# Patient Record
Sex: MALE | Race: WHITE | NOT HISPANIC OR LATINO | Employment: FULL TIME | URBAN - NONMETROPOLITAN AREA
[De-identification: names, ages, dates, MRNs, and addresses within clinical notes are randomized per-mention and may not be internally consistent; named-entity substitution may affect disease eponyms.]

---

## 2017-10-18 ENCOUNTER — OFFICE VISIT - GICH (OUTPATIENT)
Dept: FAMILY MEDICINE | Facility: OTHER | Age: 37
End: 2017-10-18

## 2017-10-18 ENCOUNTER — HISTORY (OUTPATIENT)
Dept: FAMILY MEDICINE | Facility: OTHER | Age: 37
End: 2017-10-18

## 2017-10-18 DIAGNOSIS — R59.9 ENLARGED LYMPH NODES: ICD-10-CM

## 2017-10-18 DIAGNOSIS — H00.014 HORDEOLUM EXTERNUM OF LEFT UPPER EYELID: ICD-10-CM

## 2017-12-27 NOTE — PROGRESS NOTES
"Patient Information     Patient Name MRN Sex Yrn Lundy [id number] Male 1980      Progress Notes by Nelli Castro NP at 10/18/2017  3:30 PM     Author:  Nelli Castro NP Service:  (none) Author Type:  PHYS- Nurse Practitioner     Filed:  10/19/2017 12:06 PM Encounter Date:  10/18/2017 Status:  Signed     :  Nelli Castro NP (PHYS- Nurse Practitioner)            Nursing Notes:   Yumi Singer  10/18/2017  4:56 PM  Signed  Patient presents to the clinic for facial swelling on his left side and sore jaw x5 days. Patient reports getting a sty in his left eye about a week ago and now reports that side of his face swelling and being painful. He felt lumps at the left corner of his jaw but says they went away.  Yumi COFFMAN CMA.......10/18/2017..4:11 PM    SUBJECTIVE:    Yrn Falk is a 37 y.o. male who presents for face swelling after an eye infection    HPI  Yrn Falk is here with neck swelling following an eye infection. He feels the eye infection is better but now has neck swelling. No Fevers, chills. Eye infection a stye about a week ago, thi sis getting better. Then his face swelled up, this is better too. He is now worried about the neck swelling. Is eating and drinking well.     Current Outpatient Prescriptions on File Prior to Visit       Medication  Sig Dispense Refill     ketoconazole 2% shampoo (NIZORAL) 2 % shampoo Apply for 5 minutes and wash off. 120 mL 1     No current facility-administered medications on file prior to visit.     and   Patient Active Problem List     Diagnosis  Code     Tobacco use Z72.0     Tinea corporis B35.4     Tinea versicolor B36.0       REVIEW OF SYSTEMS:  Review of Systems   Constitutional: Negative.    Eyes: Negative.    Respiratory: Negative.    Cardiovascular: Negative.    Skin: Negative.        OBJECTIVE:  /70  Pulse 64  Temp 97.3  F (36.3  C) (Tympanic)  Ht 1.981 m (6' 6\")  Wt 83.2 kg (183 lb 8 oz)  BMI 21.21 " kg/m2    EXAM:   Physical Exam   Constitutional: He is well-developed, well-nourished, and in no distress.   HENT:   Head: Normocephalic and atraumatic.   Right Ear: Tympanic membrane and ear canal normal.   Left Ear: Tympanic membrane and ear canal normal.   Nose: Nose normal.   Mouth/Throat: Uvula is midline, oropharynx is clear and moist and mucous membranes are normal.   Eyes: Conjunctivae are normal. Left eye exhibits hordeolum.   Healing stye upper LT eye lid   Neck: Neck supple.   Cardiovascular: Normal rate.    Pulmonary/Chest: Effort normal. No respiratory distress.   Lymphadenopathy:        Head (left side): Posterior auricular adenopathy present.     He has cervical adenopathy.        Left cervical: Posterior cervical adenopathy present.   Skin: Skin is warm and dry. No rash noted.   Nursing note and vitals reviewed.      ASSESSMENT/PLAN:    ICD-10-CM    1. Enlarged lymph nodes R59.9    2. Hordeolum externum of left upper eyelid H00.014         Plan:  Offered eye ointment, he declined. Discussed lymph nodes and how they enlarge after an infection or illness. Home cares discussed. His face swelling and eye swelling are improved, there was none evident on exam so no need to treat. Give this another 7-10 days if not improving, F/u with PCP.       ROBERTO MUNOZ NP ....................  10/19/2017   12:06 PM

## 2017-12-29 NOTE — PATIENT INSTRUCTIONS
Patient Information     Patient Name MRN Yrn Plummer [id number] Male 1980      Patient Instructions by Nelli Castro NP at 10/18/2017  3:30 PM     Author:  Nelli Castro NP Service:  (none) Author Type:  PHYS- Nurse Practitioner     Filed:  10/18/2017  4:58 PM Encounter Date:  10/18/2017 Status:  Signed     :  Nelli Castro NP (PHYS- Nurse Practitioner)            Lymph Nodes, Enlarged   ________________________________________________________________________  KEY POINTS    Lymph nodes are small, bean-shaped organs. They are part of the lymph system and can be found in groups or just one by itself. The lymph system is part of your body's system for fighting infection. The nodes get bigger when they fight germs like bacteria or viruses. Rarely, cancer can cause lymph nodes to swell.    Treatment is not always needed, but when it is, the treatment depends on why the nodes are enlarged.    Ask your healthcare provider what symptoms or problems you should watch for and what to do if you have them.  ________________________________________________________________________  What are lymph nodes?  Lymph nodes are small, bean-shaped organs. They are part of the lymph system and can be found in groups or just one by itself. The lymph system is part of your body's system for fighting infection. The lymph system consists of lymph nodes that store blood cells (lymphocytes) to fight infection and vessels that carry fluid, nutrients, and wastes between your body and your bloodstream. You have lymph nodes in your neck, armpits, groin, and in other places in your body. When the nodes get bigger (enlarged), you may notice lumps in your neck, armpits, or groin.  Lymph nodes are also called lymph glands even though they are not really glands.  What causes enlarged lymph nodes?  The nodes get bigger when they fight germs such as bacteria and viruses. Rarely, they become enlarged because of  cancer. For example, they may become enlarged because of:    Infection in the part of the body near the swollen nodes, such as infected sores and wounds    Infections such as colds, flu, sore throat, strep throat, ear infections, mononucleosis, and tuberculosis    Diseases such as Hodgkin's disease and other types of cancer  Once the lymph nodes become enlarged, they often do not completely go back to their normal size for many weeks. It is not unusual, for example, to feel enlarged lymph nodes in the groin because of a previous injury to the legs, such as a stubbed toe or skinned knee.  What are the symptoms?  The symptoms of enlarged lymph nodes may include:    One or more swollen lumps    Tenderness when the nodes are touched    Nodes that are hard and not movable  Talk to your healthcare provider if:    Your nodes get larger instead of smaller    They are tender and red    They are fixed in place, hard, or irregular    You have night sweats, unexpected weight loss, or fever    You find nodes in your child  How is it diagnosed?  Lymph nodes that are swollen for more than a couple of weeks should be checked by your healthcare provider. Your healthcare provider will ask about your symptoms and medical history and examine you.  You may have:    Blood tests    Lymph node biopsy, which is the removal of a small sample of tissue for testing    Chest X-ray, which is a picture of the inside of your chest to check for lymph nodes    CT scan, which uses X-rays and a computer to show detailed pictures of the liver and spleen  How are they treated?  Treatment is not always needed, but when it is, the treatment depends on why the nodes are enlarged.  How can I take care of myself?  Follow your healthcare provider s instructions. Ask your provider:    How long it will take to recover    If there are activities you should avoid and when you can return to your normal activities    How to take care of yourself at home    What  symptoms or problems you should watch for and what to do if you have them  Make sure you know when you should come back for a checkup.

## 2017-12-30 NOTE — NURSING NOTE
Patient Information     Patient Name MRN Yrn Plummer [id number] Male 1980      Nursing Note by Yumi Singer at 10/18/2017  3:30 PM     Author:  Yumi Singer Service:  (none) Author Type:  (none)     Filed:  10/18/2017  4:56 PM Encounter Date:  10/18/2017 Status:  Signed     :  Yumi Singer            Patient presents to the clinic for facial swelling on his left side and sore jaw x5 days. Patient reports getting a sty in his left eye about a week ago and now reports that side of his face swelling and being painful. He felt lumps at the left corner of his jaw but says they went away.  Yumi COFFMAN, JAIME.......10/18/2017..4:11 PM

## 2018-01-27 VITALS
WEIGHT: 183.5 LBS | BODY MASS INDEX: 21.23 KG/M2 | DIASTOLIC BLOOD PRESSURE: 70 MMHG | HEIGHT: 78 IN | TEMPERATURE: 97.3 F | HEART RATE: 64 BPM | SYSTOLIC BLOOD PRESSURE: 142 MMHG

## 2018-02-22 ENCOUNTER — DOCUMENTATION ONLY (OUTPATIENT)
Dept: FAMILY MEDICINE | Facility: OTHER | Age: 38
End: 2018-02-22

## 2018-02-22 RX ORDER — KETOCONAZOLE 20 MG/ML
SHAMPOO TOPICAL
COMMUNITY
Start: 2015-01-16 | End: 2018-06-20

## 2018-06-11 ENCOUNTER — TELEPHONE (OUTPATIENT)
Dept: FAMILY MEDICINE | Facility: OTHER | Age: 38
End: 2018-06-11

## 2018-06-11 NOTE — TELEPHONE ENCOUNTER
Patient reports that the dentist told him that he has a high arch pallet and with him being a mouth breather that he should have a sleep study. Appointment arranged for 6/20/18.  Raisa Robin LPN 6/11/2018 3:53 PM

## 2018-06-11 NOTE — TELEPHONE ENCOUNTER
Pt's wife called and stated that pt's dentist, Dr. Alfredo Barker recommended pt have a sleep test done. Please call pt back at primary ph# and let him know if he should be seen by [facility] or if an order can just be placed. Thanks.    Verenice Malave on 6/11/2018 at 12:05 PM

## 2018-06-20 ENCOUNTER — OFFICE VISIT (OUTPATIENT)
Dept: FAMILY MEDICINE | Facility: OTHER | Age: 38
End: 2018-06-20
Attending: FAMILY MEDICINE
Payer: COMMERCIAL

## 2018-06-20 VITALS
OXYGEN SATURATION: 98 % | DIASTOLIC BLOOD PRESSURE: 82 MMHG | HEIGHT: 78 IN | WEIGHT: 182.4 LBS | BODY MASS INDEX: 21.1 KG/M2 | SYSTOLIC BLOOD PRESSURE: 136 MMHG | HEART RATE: 60 BPM

## 2018-06-20 DIAGNOSIS — Z72.0 TOBACCO USE: ICD-10-CM

## 2018-06-20 DIAGNOSIS — R29.91 MARFANOID HABITUS: ICD-10-CM

## 2018-06-20 DIAGNOSIS — R06.81 WITNESSED EPISODE OF APNEA: Primary | ICD-10-CM

## 2018-06-20 PROCEDURE — 99214 OFFICE O/P EST MOD 30 MIN: CPT | Performed by: FAMILY MEDICINE

## 2018-06-20 PROCEDURE — G0463 HOSPITAL OUTPT CLINIC VISIT: HCPCS

## 2018-06-20 RX ORDER — NICOTINE 21 MG/24HR
1 PATCH, TRANSDERMAL 24 HOURS TRANSDERMAL EVERY 24 HOURS
Qty: 30 PATCH | Refills: 0 | Status: SHIPPED | OUTPATIENT
Start: 2018-08-20 | End: 2020-03-09

## 2018-06-20 RX ORDER — BUPROPION HYDROCHLORIDE 150 MG/1
150 TABLET, EXTENDED RELEASE ORAL 2 TIMES DAILY
Qty: 60 TABLET | Refills: 3 | Status: SHIPPED | OUTPATIENT
Start: 2018-06-20 | End: 2018-08-13

## 2018-06-20 RX ORDER — NICOTINE 21 MG/24HR
1 PATCH, TRANSDERMAL 24 HOURS TRANSDERMAL EVERY 24 HOURS
Qty: 30 PATCH | Refills: 1 | Status: SHIPPED | OUTPATIENT
Start: 2018-06-20 | End: 2020-03-09

## 2018-06-20 ASSESSMENT — PAIN SCALES - GENERAL: PAINLEVEL: NO PAIN (0)

## 2018-06-20 NOTE — PROGRESS NOTES
"Nursing Notes:   Leti PerezTamica, LPN  6/20/2018  3:45 PM  Signed  Pt presents to clinic today for sleep study referral. Pt states recently his dentist told him he has a high arch palette. Pt also states he sometimes stops breathing at night.  Leti SANJUANITA Perez     SUBJECTIVE:  38 year old male presents for sleep apnea concerns. His dentist notes a high arched palate and how this can affect nasal anatomy and recommended a sleep study. Patient notes that he is mainly a mouth breather. He has not tried breath right strips. His wife has witnessed apnea between snoring. Patient notes daytime sleepiness.    He's been reading about Marfan syndrome after learning about the high arched palate. He reports a normal ECHO several years ago.     Smokes 1.5 ppd. Tried Chantix with no benefit or side effects a few years ago.    REVIEW OF SYSTEMS:    Constitutional: negative  Respiratory: negative  Cardiovascular: negative  Gastrointestinal: negative    No current outpatient prescriptions on file.     No Known Allergies    Past Medical History:   Diagnosis Date     Tobacco use 1/16/2015      Past Surgical History:   Procedure Laterality Date     VASECTOMY      5/31/13      OBJECTIVE:  /82 (BP Location: Right arm, Patient Position: Chair, Cuff Size: Adult Regular)  Pulse 60  Ht 6' 6\" (1.981 m)  Wt 182 lb 6.4 oz (82.7 kg)  SpO2 98%  BMI 21.08 kg/m2    EXAM:  General Appearance: Pleasant, alert, appropriate appearance for age. No acute distress  Nose: Deviated septum. No polyps  OroPharynx Exam: Normal pharynx. Mallampati 2/4  Neck Exam: Supple, no masses or nodes.  Chest/Respiratory Exam: No pectus excavatum. Clear to auscultation.  Cardiovascular Exam: Regular rate and rhythm. S1, S2, no murmur, click, gallop, or rubs.  Musculoskeletal: Arm span matches height. He can wrap thumb and fingers around wrist. Can cross thumb beyond hand.  Psychiatric: Normal affect and mentation     ASSESSMENT/PLAN:    ICD-10-CM    1. Witnessed " episode of apnea R06.81 SLEEP EVALUATION & MANAGEMENT REFERRAL - ADULT -[facility] [phone] (Age 13 and up)   2. 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 Tobacco use Z72.0 nicotine (NICODERM CQ) 21 MG/24HR 24 hr patch     buPROPion (WELLBUTRIN SR) 150 MG 12 hr tablet     nicotine (NICODERM CQ) 14 MG/24HR 24 hr patch     nicotine (NICODERM CQ) 7 MG/24HR 24 hr patch       He has witnessed apneas and daytime sleepiness. Referral placed for sleep study followed by sleep consult.    Is tall and slender with positive wrist and thumb signs for marfan, but not other features. Discussed diagnosis. Recommend ECHO to assess for valve and aorta problems. Also, should have dilated eye exam. If he has positive findings, then consider genetic testing.    Discussed risks with tobacco use. He is interested in quitting. Reviewed available options for smoking cessation. Patient chose to use bupropion and nicotine patches. Discussed appropriate use of medication. Performed tobacco cessation counseling for greater than 4 minutes.      Venu Stringer MD    This document was prepared using a combination of typing and voice generated software.  While every attempt was made for accuracy, spelling and grammatical errors may exist.

## 2018-06-20 NOTE — PATIENT INSTRUCTIONS
Ordered sleep study, someone should call to schedule  Ordered echocardiogram to look at the aorta  You should see an eye doctor for potential marfan findings    Take bupropion 150 mg daily for 4 days, then take twice daily  Start nicotine patch 21 mg daily about 1 month later and take for 2 months, then 14 mg for a month, then 7 mg for a month  Quit bupropion in the 3-4 month span

## 2018-06-20 NOTE — NURSING NOTE
Pt presents to clinic today for sleep study referral. Pt states recently his dentist told him he has a high arch palette. Pt also states he sometimes stops breathing at night.  Leti Perez

## 2018-06-20 NOTE — MR AVS SNAPSHOT
After Visit Summary   6/20/2018    Yrn Falk    MRN: [id number]           Patient Information     Date Of Birth          1980        Visit Information        Provider Department      6/20/2018 3:30 PM Venu Stringer MD [facility]        Today's Diagnoses     Witnessed episode of apnea    -  1    Marfanoid habitus        Tobacco use          Care Instructions    Ordered sleep study, someone should call to schedule  Ordered echocardiogram to look at the aorta  You should see an eye doctor for potential marfan findings    Take bupropion 150 mg daily for 4 days, then take twice daily  Start nicotine patch 21 mg daily about 1 month later and take for 2 months, then 14 mg for a month, then 7 mg for a month  Quit bupropion in the 3-4 month span            Follow-ups after your visit        Additional Services     SLEEP EVALUATION & MANAGEMENT REFERRAL - [facility] [phone] (Age 13 and up)       Please be aware that coverage of these services is subject to the terms and limitations of your health insurance plan.  Call member services at your health plan with any benefit or coverage questions.      Please bring the following to your appointment:    >>   List of current medications   >>   This referral request   >>   Any documents/labs given to you for this referral                      Future tests that were ordered for you today     Open Future Orders        Priority Expected Expires Ordered    SLEEP EVALUATION & MANAGEMENT REFERRAL - [facility] [phone] (Age 13 and up) Routine  6/20/2019 6/20/2018    Echocardiogram Complete Routine  6/20/2019 6/20/2018            Who to contact     If you have questions or need follow up information about today's clinic visit or your schedule please contact [facility] directly at [phone].  Normal or non-critical lab  "and imaging results will be communicated to you by MyChart, letter or phone within 4 business days after the clinic has received the results. If you do not hear from us within 7 days, please contact the clinic through MyChart or phone. If you have a critical or abnormal lab result, we will notify you by phone as soon as possible.  Submit refill requests through [employer] or call your pharmacy and they will forward the refill request to us. Please allow 3 business days for your refill to be completed.          Additional Information About Your Visit        Care EveryWhere ID     This is your Care EveryWhere ID. This could be used by other organizations to access your [facility] medical records  [id number]        Your Vitals Were     Pulse Height Pulse Oximetry BMI (Body Mass Index)          60 6' 6\" (1.981 m) 98% 21.08 kg/m2         Blood Pressure from Last 3 Encounters:   06/20/18 136/82   10/18/17 142/70   03/23/15 130/80    Weight from Last 3 Encounters:   06/20/18 182 lb 6.4 oz (82.7 kg)   10/18/17 183 lb 8 oz (83.2 kg)   03/23/15 180 lb (81.6 kg)                 Today's Medication Changes          These changes are accurate as of 6/20/18  4:13 PM.  If you have any questions, ask your nurse or doctor.               Start taking these medicines.        Dose/Directions    buPROPion 150 MG 12 hr tablet   Commonly known as:  WELLBUTRIN SR   Used for:  Tobacco use   Started by:  Venu Stringer MD        Dose:  150 mg   Take 1 tablet (150 mg) by mouth 2 times daily   Quantity:  60 tablet   Refills:  3       * nicotine 21 MG/24HR 24 hr patch   Commonly known as:  NICODERM CQ   Used for:  Tobacco use   Started by:  Venu Stringer MD        Dose:  1 patch   Place 1 patch onto the skin every 24 hours   Quantity:  30 patch   Refills:  1       * nicotine 14 MG/24HR 24 hr patch   Commonly known as:  NICODERM CQ   Used for:  Tobacco use   Started by:  Venu Stringer MD        Dose:  1 patch   Start taking on:  " 8/20/2018   Place 1 patch onto the skin every 24 hours   Quantity:  30 patch   Refills:  0       * nicotine 7 MG/24HR 24 hr patch   Commonly known as:  NICODERM CQ   Used for:  Tobacco use   Started by:  Venu Stringer MD        Dose:  1 patch   Start taking on:  9/20/2018   Place 1 patch onto the skin every 24 hours   Quantity:  30 patch   Refills:  0       * Notice:  This list has 3 medication(s) that are the same as other medications prescribed for you. Read the directions carefully, and ask your doctor or other care provider to review them with you.         Where to get your medicines      These medications were sent to [id number] ([employer] [facility]) - [place], [place] - [address]  [address], [facility] [address]     Phone:  [phone]     buPROPion 150 MG 12 hr tablet    nicotine 14 MG/24HR 24 hr patch    nicotine 21 MG/24HR 24 hr patch    nicotine 7 MG/24HR 24 hr patch                Primary Care Provider Office Phone # Fax #    Cathy Sherwood [phone] [address] [facility] [address]        Equal Access to Services     [facility] AH: Hadii aad ku hadasho Soomaali, waaxda luqadaha, qaybta kaalmada adeegyada, waxay michaelin haychristyn domenico barrera lagloria ah. So [facility] [phone].    ATENCIÓN: Si habla español, tiene a marino disposición servicios gratuitos de asistencia lingüística. [facility] [phone].    We comply with applicable federal civil rights laws and [place] laws. We do not discriminate on the basis of race, color, national origin, age, disability, sex, sexual orientation, or gender identity.            Thank you!     Thank you for choosing [facility]  for your care. Our goal is always to provide you with excellent care. Hearing back from our patients is one way we can continue to improve our services. Please take a few minutes to complete the written survey that you may receive in the mail after your visit with  us. Thank you!             Your Updated Medication List - Protect others around you: Learn how to safely use, store and throw away your medicines at www.disposemymeds.org.          This list is accurate as of 6/20/18  4:13 PM.  Always use your most recent med list.                   Brand Name Dispense Instructions for use Diagnosis    buPROPion 150 MG 12 hr tablet    WELLBUTRIN SR    60 tablet    Take 1 tablet (150 mg) by mouth 2 times daily    Tobacco use       * nicotine 21 MG/24HR 24 hr patch    NICODERM CQ    30 patch    Place 1 patch onto the skin every 24 hours    Tobacco use       * nicotine 14 MG/24HR 24 hr patch   Start taking on:  8/20/2018    NICODERM CQ    30 patch    Place 1 patch onto the skin every 24 hours    Tobacco use       * nicotine 7 MG/24HR 24 hr patch   Start taking on:  9/20/2018    NICODERM CQ    30 patch    Place 1 patch onto the skin every 24 hours    Tobacco use       * Notice:  This list has 3 medication(s) that are the same as other medications prescribed for you. Read the directions carefully, and ask your doctor or other care provider to review them with you.

## 2018-06-28 ENCOUNTER — HOSPITAL ENCOUNTER (OUTPATIENT)
Dept: CARDIOLOGY | Facility: OTHER | Age: 38
Discharge: HOME | End: 2018-06-28
Attending: FAMILY MEDICINE | Admitting: FAMILY MEDICINE
Payer: COMMERCIAL

## 2018-06-28 DIAGNOSIS — R29.91 MARFANOID HABITUS: ICD-10-CM

## 2018-06-28 PROCEDURE — 93306 TTE W/DOPPLER COMPLETE: CPT | Mod: 26 | Performed by: INTERNAL MEDICINE

## 2018-06-28 PROCEDURE — 93306 TTE W/DOPPLER COMPLETE: CPT

## 2018-06-29 ENCOUNTER — TELEPHONE (OUTPATIENT)
Dept: FAMILY MEDICINE | Facility: OTHER | Age: 38
End: 2018-06-29

## 2018-07-09 ENCOUNTER — THERAPY VISIT (OUTPATIENT)
Dept: SLEEP MEDICINE | Facility: OTHER | Age: 38
End: 2018-07-09
Attending: FAMILY MEDICINE
Payer: COMMERCIAL

## 2018-07-09 DIAGNOSIS — R06.81 WITNESSED APNEIC SPELLS: Primary | ICD-10-CM

## 2018-07-09 DIAGNOSIS — G47.19 EXCESSIVE DAYTIME SLEEPINESS: ICD-10-CM

## 2018-07-09 PROCEDURE — 95810 POLYSOM 6/> YRS 4/> PARAM: CPT | Performed by: INTERNAL MEDICINE

## 2018-08-13 ENCOUNTER — MEDICAL CORRESPONDENCE (OUTPATIENT)
Dept: HEALTH INFORMATION MANAGEMENT | Facility: OTHER | Age: 38
End: 2018-08-13

## 2018-08-13 ENCOUNTER — OFFICE VISIT (OUTPATIENT)
Dept: PULMONOLOGY | Facility: OTHER | Age: 38
End: 2018-08-13
Attending: INTERNAL MEDICINE
Payer: COMMERCIAL

## 2018-08-13 VITALS
OXYGEN SATURATION: 98 % | HEIGHT: 78 IN | SYSTOLIC BLOOD PRESSURE: 122 MMHG | HEART RATE: 65 BPM | WEIGHT: 181 LBS | BODY MASS INDEX: 20.94 KG/M2 | DIASTOLIC BLOOD PRESSURE: 68 MMHG

## 2018-08-13 DIAGNOSIS — G47.33 OSA (OBSTRUCTIVE SLEEP APNEA): Primary | ICD-10-CM

## 2018-08-13 DIAGNOSIS — G47.10 HYPERSOMNIA: ICD-10-CM

## 2018-08-13 PROCEDURE — G0463 HOSPITAL OUTPT CLINIC VISIT: HCPCS

## 2018-08-13 ASSESSMENT — PAIN SCALES - GENERAL: PAINLEVEL: NO PAIN (0)

## 2018-08-13 NOTE — PROGRESS NOTES
"Sleep Medicine Note  Yrn Falk  August 13, 2018  [id number]    Chief Complaint: Tiredness with fatigue    History of Present Illness: Yrn Falk is a 38 year old male presenting for above complaint.  He is here to review and discuss the results of his sleep study which was performed 6/28/2018.  His sleep study showed mild obstructive sleep apnea with an apnea hypopnea index of 6.3/h and a respiratory disturbance index of 11.7/h.  His dentist Initially suggested that he have a sleep study as he noticed posterior crowding.  He scores himself 5 on the [place] Sleepiness Scale but he does report tiredness is much worse than it was previously was.  His tiredness affects his ability to perform his daily activities.  He has not fallen asleep inappropriately.  He works as a hendrix and doing construction.  He does not drink alcohol at night.  He is accompanied by his wife who notes loud snoring and frequent apneas.  She is disturbed by his sleep.  Not have any history of hypertension or heart disease.  Family history is negative for sleep apnea.        Past Medical History:  Past Medical History:   Diagnosis Date     Tobacco use 1/16/2015       Medications:  Current Outpatient Prescriptions   Medication     [START ON 8/20/2018] nicotine (NICODERM CQ) 14 MG/24HR 24 hr patch     nicotine (NICODERM CQ) 21 MG/24HR 24 hr patch     [START ON 9/20/2018] nicotine (NICODERM CQ) 7 MG/24HR 24 hr patch     No current facility-administered medications for this visit.        Physical Exam:  /68 (BP Location: Right arm, Patient Position: Sitting, Cuff Size: Adult Large)  Pulse 65  Ht 6' 6\" (1.981 m)  Wt 181 lb (82.1 kg)  SpO2 98%  BMI 20.92 kg/m2  Dentition is intact, posterior crowding is noted, malampatti class IV.  No significant tonsillar tissue.  Lungs are clear without wheeze, rhonchi, crackles, or focal dullness.  Cardiovascular exam S1-S2, regular rhythm and rate, no murmur, rub, or " penny.    Assessment and Plan:  38 year old male presenting for mild obstructive sleep apnea.  We discussed the signs and symptoms of sleep apnea.  He does have considerable tiredness.  He awakens feeling non-restored about half of the time.  With symptomatic mild obstructive sleep apnea we discussed ordering an auto titrate CPAP unit.  Compliance requirements were discussed.  Plan will be for ordering CPAP with [facility] with a sleep follow-up visit between 31 and 90 days..    Cc: [facility]  Cc: Dr. Venu Stringer

## 2018-08-13 NOTE — MR AVS SNAPSHOT
"              After Visit Summary   8/13/2018    Yrn Falk    MRN: [id number]           Patient Information     Date Of Birth          1980        Visit Information        Provider Department      8/13/2018 3:20 PM Chirag Escoto [facility]        Today's Diagnoses     GENNY (obstructive sleep apnea)    -  1    Hypersomnia           Follow-ups after your visit        Who to contact     If you have questions or need follow up information about today's clinic visit or your schedule please contact [facility] directly at [phone].  Normal or non-critical lab and imaging results will be communicated to you by MyChart, letter or phone within 4 business days after the clinic has received the results. If you do not hear from us within 7 days, please contact the clinic through MyChart or phone. If you have a critical or abnormal lab result, we will notify you by phone as soon as possible.  Submit refill requests through [employer] or call your pharmacy and they will forward the refill request to us. Please allow 3 business days for your refill to be completed.          Additional Information About Your Visit        Care EveryWhere ID     This is your Care EveryWhere ID. This could be used by other organizations to access your [place] medical records  [id number]        Your Vitals Were     Pulse Height Pulse Oximetry BMI (Body Mass Index)          65 6' 6\" (1.981 m) 98% 20.92 kg/m2         Blood Pressure from Last 3 Encounters:   08/13/18 122/68   06/20/18 136/82   10/18/17 142/70    Weight from Last 3 Encounters:   08/13/18 181 lb (82.1 kg)   06/20/18 182 lb 6.4 oz (82.7 kg)   10/18/17 183 lb 8 oz (83.2 kg)              Today, you had the following     No orders found for display         Today's Medication Changes          These changes are accurate as of 8/13/18  3:40 PM.  If you have any questions, ask your nurse or doctor.               Stop taking these " medicines if you haven't already. Please contact your care team if you have questions.     buPROPion 150 MG 12 hr tablet   Commonly known as:  WELLBUTRIN SR   Stopped by:  Chirag Escoto                    Primary Care Provider Office Phone # Fax #    Cathy Sherwood [phone] [address]  [place] [address]        Equal Access to Services     [facility]: Hadii aad ku hadasho Soomaali, waaxda luqadaha, qaybta kaalmada adeegyada, waxay idiin hayaan adeeg kharash la'aan ah. So [facility] [phone].    ATENCIÓN: Si habla español, tiene a marino disposición servicios gratuitos de asistencia lingüística. Llame al [phone].    We comply with applicable federal civil rights laws and [place] laws. We do not discriminate on the basis of race, color, national origin, age, disability, sex, sexual orientation, or gender identity.            Thank you!     Thank you for choosing [facility] AND [facility]  for your care. Our goal is always to provide you with excellent care. Hearing back from our patients is one way we can continue to improve our services. Please take a few minutes to complete the written survey that you may receive in the mail after your visit with us. Thank you!             Your Updated Medication List - Protect others around you: Learn how to safely use, store and throw away your medicines at www.disposemymeds.org.          This list is accurate as of 8/13/18  3:40 PM.  Always use your most recent med list.                   Brand Name Dispense Instructions for use Diagnosis    * nicotine 21 MG/24HR 24 hr patch    NICODERM CQ    30 patch    Place 1 patch onto the skin every 24 hours    Tobacco use       * nicotine 14 MG/24HR 24 hr patch   Start taking on:  8/20/2018    NICODERM CQ    30 patch    Place 1 patch onto the skin every 24 hours    Tobacco use       * nicotine 7 MG/24HR 24 hr patch   Start taking on:  9/20/2018    NICODERM CQ    30 patch    Place 1 patch  onto the skin every 24 hours    Tobacco use       * Notice:  This list has 3 medication(s) that are the same as other medications prescribed for you. Read the directions carefully, and ask your doctor or other care provider to review them with you.

## 2018-08-23 ENCOUNTER — HOSPITAL ENCOUNTER (OUTPATIENT)
Dept: RESPIRATORY THERAPY | Facility: OTHER | Age: 38
End: 2018-08-23
Attending: INTERNAL MEDICINE
Payer: COMMERCIAL

## 2018-08-23 PROCEDURE — 40000275 ZZH STATISTIC RCP TIME EA 10 MIN

## 2018-08-23 NOTE — PROGRESS NOTES
Set patient up with Auto Cpap 5-18.  Fitted with Large F&P Simplus FF mask.  Discussed machine usage, supplies and cleaning.   He had no questions or concerns at this time.

## 2018-08-27 ENCOUNTER — TELEPHONE (OUTPATIENT)
Dept: FAMILY MEDICINE | Facility: OTHER | Age: 38
End: 2018-08-27

## 2018-08-27 NOTE — TELEPHONE ENCOUNTER
After birth date was verified, spoke with Markie. He stated that he started using a CPAP machine last Thursday. Patient is requesting a pressure adjustment as he has been waking up at 4 am with horrible gas pain and intense bloating of his stomach.     Patient states that he wears it for about 4 hours at a time right now. It titrate's to 10 and has the ability to be set from 5-18.       Would this be better sent to Dr. Escoto as he is in the office today?    Eladio Perez LPN 08/27/18 10:26 AM

## 2018-09-04 ENCOUNTER — TELEPHONE (OUTPATIENT)
Dept: FAMILY MEDICINE | Facility: OTHER | Age: 38
End: 2018-09-04

## 2018-09-05 NOTE — TELEPHONE ENCOUNTER
Pt notified of message below and states he is going to call Dr. Escoto office today.  Leti Perez

## 2018-09-05 NOTE — TELEPHONE ENCOUNTER
I do not have the same data that the sleep doctor does, so I am not sure if this change is good or not. He should be contacting Dr Escoto for changes to the CPAP

## 2018-09-24 ENCOUNTER — OFFICE VISIT (OUTPATIENT)
Dept: PULMONOLOGY | Facility: OTHER | Age: 38
End: 2018-09-24
Attending: INTERNAL MEDICINE
Payer: COMMERCIAL

## 2018-09-24 VITALS
WEIGHT: 189 LBS | HEIGHT: 77 IN | BODY MASS INDEX: 22.32 KG/M2 | HEART RATE: 52 BPM | DIASTOLIC BLOOD PRESSURE: 86 MMHG | SYSTOLIC BLOOD PRESSURE: 130 MMHG

## 2018-09-24 DIAGNOSIS — G47.33 OSA ON CPAP: Primary | ICD-10-CM

## 2018-09-24 PROCEDURE — G0463 HOSPITAL OUTPT CLINIC VISIT: HCPCS

## 2018-09-24 NOTE — PROGRESS NOTES
"Sleep Medicine Progress Note  Ynr Falk  September 24, 2018  [id number]    Chief Complaint: CPAP compliance and problems with CPAP    History of Present Illness: Yrn Falk is a 38 year old male presenting for above complaint.  He has a history of obstructive sleep apnea diagnosed on polysomnography this year.  He was shown to have mild sleep apnea.  He had trouble exhaling against pressure and has been on EPR with a full facemask.  He is wondering if he could get by with a nasal mask.  He is swallowing air about 3 nights out of 7 and when it happens it is uncomfortable and he has to take his CPAP off.  This was worse when he was on a higher pressure.  He is noticing benefit with CPAP.  His apnea hypopnea index has been reduced to 2-3/h on auto titrate mode 8-11 cm water pressure.  His [place] is 5.  His wife is no longer hearing snoring.  He is finding it beneficial and wants to continue it.  He is hoping that the air swallowing will resolve completely.        Past Medical History:  Past Medical History:   Diagnosis Date     Tobacco use 1/16/2015       Medications:  Current Outpatient Prescriptions   Medication     nicotine (NICODERM CQ) 14 MG/24HR 24 hr patch     nicotine (NICODERM CQ) 21 MG/24HR 24 hr patch     nicotine (NICODERM CQ) 7 MG/24HR 24 hr patch     No current facility-administered medications for this visit.        Physical Exam:  /86 (BP Location: Right arm, Patient Position: Sitting, Cuff Size: Adult Large)  Pulse 52  Ht 6' 4.5\" (1.943 m)  Wt 189 lb (85.7 kg)  BMI 22.71 kg/m2  His exam is limited to vitals    Assessment and Plan:  38 year old male presenting for mild sleep apnea however he is benefiting from CPAP.  The plan is to continue CPAP.  He may have less air swallowing if we use a nasal mask.  I will order a nasal mask.  He would also like to try the pressure 1 notch lower at 10 cm water pressure.  I will set his CPAP at 10 cm water pressure and with a nasal mask he " will likely need less pressure as full facemasks require slightly more pressure than nasal.  We did discuss discontinuing the EPR but he feels like he could not do this.  The EPR could also contribute to air swallowing.  Plan is for a pressure of 10, ordering a nasal mask and reassessing how he is doing in a few weeks.  I will check a download in 1 week.    Cc: [employer]  Cc: Dr. Cathy Sherwood.

## 2018-09-24 NOTE — MR AVS SNAPSHOT
"              After Visit Summary   2018    Yrn Falk    MRN: [id number]           Patient Information     Date Of Birth          1980        Visit Information        Provider Department      2018 2:40 PM Chirag Escoto [facility]        Today's Diagnoses     GENNY on CPAP    -  1       Follow-ups after your visit        Who to contact     If you have questions or need follow up information about today's clinic visit or your schedule please contact [facility] directly at [phone].  Normal or non-critical lab and imaging results will be communicated to you by WinLocalhart, letter or phone within 4 business days after the clinic has received the results. If you do not hear from us within 7 days, please contact the clinic through Talentagt or phone. If you have a critical or abnormal lab result, we will notify you by phone as soon as possible.  Submit refill requests through [employer] or call your pharmacy and they will forward the refill request to us. Please allow 3 business days for your refill to be completed.          Additional Information About Your Visit        MyChart Information     [employer] lets you send messages to your doctor, view your test results, renew your prescriptions, schedule appointments and more. To sign up, go to [email or web address] . Click on \"Log in\" on the left side of the screen, which will take you to the Welcome page. Then click on \"Sign up Now\" on the right side of the page.     You will be asked to enter the access code listed below, as well as some personal information. Please follow the directions to create your username and password.     Your access code is: [email or web address]  Expires: 2018  7:50 AM     Your access code will  in 90 days. If you need help or a new code, please call your [facility] or [phone].        Care EveryWhere ID     This is your Care EveryWhere ID. This could be used by other " "organizations to access your [place] medical records  [id number]        Your Vitals Were     Pulse Height BMI (Body Mass Index)             52 6' 4.5\" (1.943 m) 22.71 kg/m2          Blood Pressure from Last 3 Encounters:   09/24/18 130/86   08/13/18 122/68   06/20/18 136/82    Weight from Last 3 Encounters:   09/24/18 189 lb (85.7 kg)   08/13/18 181 lb (82.1 kg)   06/20/18 182 lb 6.4 oz (82.7 kg)              Today, you had the following     No orders found for display       Primary Care Provider Office Phone # Fax #    Venu Stringer [phone]       [address] [facility] [address]        Equal Access to Services     [facility]: Hadii soheila caldwello Somadhuri, waaxda luqadaha, qaybta kaalmada shruthi, quang alva . So [facility] [place] [phone].    ATENCIÓN: Si habla español, tiene a marino disposición servicios gratuitos de asistencia lingüística. Nadine al [phone].    We comply with applicable federal civil rights laws and [place] laws. We do not discriminate on the basis of race, color, national origin, age, disability, sex, sexual orientation, or gender identity.            Thank you!     Thank you for choosing [facility] AND [facility]  for your care. Our goal is always to provide you with excellent care. Hearing back from our patients is one way we can continue to improve our services. Please take a few minutes to complete the written survey that you may receive in the mail after your visit with us. Thank you!             Your Updated Medication List - Protect others around you: Learn how to safely use, store and throw away your medicines at www.disposemymeds.org.          This list is accurate as of 9/24/18 11:59 PM.  Always use your most recent med list.                   Brand Name Dispense Instructions for use Diagnosis    * nicotine 21 MG/24HR 24 hr patch    NICODERM CQ    30 patch    Place 1 patch onto the skin every 24 hours    " Tobacco use       * nicotine 14 MG/24HR 24 hr patch    NICODERM CQ    30 patch    Place 1 patch onto the skin every 24 hours    Tobacco use       * nicotine 7 MG/24HR 24 hr patch    NICODERM CQ    30 patch    Place 1 patch onto the skin every 24 hours    Tobacco use       * Notice:  This list has 3 medication(s) that are the same as other medications prescribed for you. Read the directions carefully, and ask your doctor or other care provider to review them with you.

## 2019-11-15 ENCOUNTER — TELEPHONE (OUTPATIENT)
Dept: FAMILY MEDICINE | Facility: OTHER | Age: 39
End: 2019-11-15

## 2019-11-15 DIAGNOSIS — B36.0 TINEA VERSICOLOR: Primary | ICD-10-CM

## 2019-11-15 RX ORDER — KETOCONAZOLE 20 MG/ML
SHAMPOO TOPICAL
Qty: 100 ML | Refills: 0 | Status: SHIPPED | OUTPATIENT
Start: 2019-11-15 | End: 2020-03-09

## 2019-11-15 NOTE — TELEPHONE ENCOUNTER
Pharmacy is requesting a refill no Ketoconazole 2% shampoo. Original fill 01/01/2018, not currently on medication list, last office visit 06/20/2018. Please advise. Raisa Yancey LPN .......................11/15/2019  9:32 AM

## 2020-03-09 ENCOUNTER — OFFICE VISIT (OUTPATIENT)
Dept: PODIATRY | Facility: OTHER | Age: 40
End: 2020-03-09
Attending: PODIATRIST
Payer: COMMERCIAL

## 2020-03-09 VITALS
HEIGHT: 78 IN | HEART RATE: 54 BPM | WEIGHT: 205 LBS | OXYGEN SATURATION: 97 % | BODY MASS INDEX: 23.72 KG/M2 | TEMPERATURE: 98.1 F | SYSTOLIC BLOOD PRESSURE: 121 MMHG | DIASTOLIC BLOOD PRESSURE: 70 MMHG

## 2020-03-09 DIAGNOSIS — M62.461 GASTROCNEMIUS EQUINUS OF RIGHT LOWER EXTREMITY: ICD-10-CM

## 2020-03-09 DIAGNOSIS — M20.41 ACQUIRED HAMMER TOE DEFORMITY OF LESSER TOE OF BOTH FEET: ICD-10-CM

## 2020-03-09 DIAGNOSIS — Q66.70 PES CAVUS, CONGENITAL: Primary | ICD-10-CM

## 2020-03-09 DIAGNOSIS — M62.462 GASTROCNEMIUS EQUINUS OF LEFT LOWER EXTREMITY: ICD-10-CM

## 2020-03-09 DIAGNOSIS — M20.42 ACQUIRED HAMMER TOE DEFORMITY OF LESSER TOE OF BOTH FEET: ICD-10-CM

## 2020-03-09 PROCEDURE — G0463 HOSPITAL OUTPT CLINIC VISIT: HCPCS

## 2020-03-09 PROCEDURE — 99203 OFFICE O/P NEW LOW 30 MIN: CPT | Performed by: PODIATRIST

## 2020-03-09 RX ORDER — KETOCONAZOLE 20 MG/ML
1 SHAMPOO TOPICAL DAILY
COMMUNITY
Start: 2019-11-15 | End: 2021-11-04

## 2020-03-09 ASSESSMENT — MIFFLIN-ST. JEOR: SCORE: 1978.12

## 2020-03-09 ASSESSMENT — PAIN SCALES - GENERAL: PAINLEVEL: NO PAIN (0)

## 2020-03-09 NOTE — NURSING NOTE
"Chief Complaint   Patient presents with     Consult     foot deformity       Initial /70 (BP Location: Left arm, Cuff Size: Adult Regular)   Pulse 54   Temp 98.1  F (36.7  C) (Tympanic)   Ht 1.981 m (6' 6\")   Wt 93 kg (205 lb)   SpO2 97%   BMI 23.69 kg/m   Estimated body mass index is 23.69 kg/m  as calculated from the following:    Height as of this encounter: 1.981 m (6' 6\").    Weight as of this encounter: 93 kg (205 lb).  Medication Reconciliation: complete  Dunia Reeves LPN  "

## 2020-03-09 NOTE — PROGRESS NOTES
"Chief complaint: Patient presents with:  Consult: foot deformity      History of Present Illness: This 39 year old male is seen at the request of No ref. provider found for evaluation and suggestions of management of high arches. His son is with him today to be treated for his feet as well and the patient says his son has his feet. The patient saw a podiatrist recently who told him he had CMT. The patient looked up the diagnosis and he would like to know if he does in fact have CMT. He was fit for accommodative inserts at [facility] because he was previously having pain from a cracking callus on the RIGHT plantar 1st metatarsal head. The inserts seem to be wearing out already and they hurt his heel. He is wondering what he should do about this. No further pedal complaints today.       /70 (BP Location: Left arm, Cuff Size: Adult Regular)   Pulse 54   Temp 98.1  F (36.7  C) (Tympanic)   Ht 1.981 m (6' 6\")   Wt 93 kg (205 lb)   SpO2 97%   BMI 23.69 kg/m      Patient Active Problem List   Diagnosis     Tinea corporis     Tinea versicolor     Tobacco use       Past Surgical History:   Procedure Laterality Date     VASECTOMY      5/31/13       Current Outpatient Medications   Medication     ketoconazole (NIZORAL) 2 % external shampoo     No current facility-administered medications for this visit.         No Known Allergies    No family history on file.    Social History     Socioeconomic History     Marital status:      Spouse name: Not on file     Number of children: Not on file     Years of education: Not on file     Highest education level: Not on file   Occupational History     Not on file   Social Needs     Financial resource strain: Not on file     Food insecurity     Worry: Not on file     Inability: Not on file     Transportation needs     Medical: Not on file     Non-medical: Not on file   Tobacco Use     Smoking status: Current Every Day Smoker     Packs/day: 1.50     " Types: Cigarettes     Smokeless tobacco: Never Used   Substance and Sexual Activity     Alcohol use: No     Drug use: No     Comment: Drug use: No     Sexual activity: Yes     Partners: Female   Lifestyle     Physical activity     Days per week: Not on file     Minutes per session: Not on file     Stress: Not on file   Relationships     Social connections     Talks on phone: Not on file     Gets together: Not on file     Attends [employer]ism service: Not on file     Active member of club or organization: Not on file     Attends meetings of clubs or organizations: Not on file     Relationship status: Not on file     Intimate partner violence     Fear of current or ex partner: Not on file     Emotionally abused: Not on file     Physically abused: Not on file     Forced sexual activity: Not on file   Other Topics Concern     Parent/sibling w/ CABG, MI or angioplasty before 65F 55M? Not Asked   Social History Narrative    Works as a hendrix. , 2 children.    Preload  5/29/2013       ROS: 10 point ROS neg other than the symptoms noted above in the HPI.  EXAM  Constitutional: healthy, alert and no distress    Psychiatric: mentation appears normal and affect normal/bright    VASCULAR:  -Hair growth Present to b/l anterior legs and ankles  NEURO:  -Light touch sensation intact to b/l plantar forefoot  DERM:  -Skin temperature, texture and turgor WNL b/l  MSK:  -Moderate-to-significant high arch to bilateral feet while patient is NWB  ---LEFT heel neutral and RIGHT heel 1 degree varus RCSP   ---RIGHT hindfoot varus corrects to neutral with Razo block test  -Bowstringing of bilateral dorsal extensor tendons to digits 1-5 bilaterally    -Prominent metatarsal heads 1-5 bilaterally   -DORSIFLEXION contracture to MTPJ 2-5 b/l with flexion contracture to PIPJ of digits 2-5 b/l   -Atrophy of distal half of calves with a champagne bottle appearance to the legs  -Muscle strength of ankles +5/5 for dorsiflexion,  plantarflexion, ABDUction and ADDuction b/l    -Ankle joint passive ROM within normal limits except for dorsiflexion:    Dorsiflexion, RIGHT Straight knee 0 degrees    Dorsiflexion, LEFT Straight knee 0 degrees  ============================================================    ASSESSMENT:  (Q66.70) Pes cavus, congenital  (primary encounter diagnosis)    (M20.41,  M20.42) 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 Treatment options discussed with no educational barriers noted.  -Although the patient has a classic clinical appearance of CMT in his legs and feet, he expresses no h/o weakness, no increase in falling, no foot pain, bilaterally. He says he has always had the champagne bottle appearance to his legs, but it has never caused weakness to his legs and feet. At this time, his h/o does not prove he has CMT. He was educated and monitoring his legs for weakness, increased falls, etc. And to discuss this with his PCP immediately if he starts noticing these changes. He has no family h/o CMT and no symptoms other than a moderate bilateral pes cavus and atrophied calf muscles.  ---Reflexes unable to be fully determined today since the reflex hammer was not in any of the clinic drawers today where it normally sits, but patient was able to easily walk and stand without appearance of any weakness.  -Orthotics referral to [facility] for modificaiton of patients inserts. The current inserts are accommodative and are starting to loose correction for the bilateral feet.  -Patient in agreement with the above treatment plan and all of patient's questions were answered.      RTC as needed        Keyla Jaime DPM

## 2020-03-19 ENCOUNTER — MEDICAL CORRESPONDENCE (OUTPATIENT)
Dept: HEALTH INFORMATION MANAGEMENT | Facility: CLINIC | Age: 40
End: 2020-03-19

## 2021-05-26 ENCOUNTER — OFFICE VISIT (OUTPATIENT)
Dept: FAMILY MEDICINE | Facility: OTHER | Age: 41
End: 2021-05-26
Attending: FAMILY MEDICINE
Payer: COMMERCIAL

## 2021-05-26 VITALS
BODY MASS INDEX: 23.49 KG/M2 | DIASTOLIC BLOOD PRESSURE: 78 MMHG | OXYGEN SATURATION: 94 % | RESPIRATION RATE: 16 BRPM | SYSTOLIC BLOOD PRESSURE: 126 MMHG | HEIGHT: 78 IN | HEART RATE: 56 BPM | TEMPERATURE: 98.5 F | WEIGHT: 203 LBS

## 2021-05-26 DIAGNOSIS — D22.9 MULTIPLE ATYPICAL NEVI: ICD-10-CM

## 2021-05-26 DIAGNOSIS — L72.3 SEBACEOUS CYST: Primary | ICD-10-CM

## 2021-05-26 PROCEDURE — G0463 HOSPITAL OUTPT CLINIC VISIT: HCPCS

## 2021-05-26 PROCEDURE — G0463 HOSPITAL OUTPT CLINIC VISIT: HCPCS | Mod: 25

## 2021-05-26 PROCEDURE — 99213 OFFICE O/P EST LOW 20 MIN: CPT | Performed by: FAMILY MEDICINE

## 2021-05-26 ASSESSMENT — ANXIETY QUESTIONNAIRES
7. FEELING AFRAID AS IF SOMETHING AWFUL MIGHT HAPPEN: NOT AT ALL
6. BECOMING EASILY ANNOYED OR IRRITABLE: NOT AT ALL
3. WORRYING TOO MUCH ABOUT DIFFERENT THINGS: NOT AT ALL
GAD7 TOTAL SCORE: 0
2. NOT BEING ABLE TO STOP OR CONTROL WORRYING: NOT AT ALL
1. FEELING NERVOUS, ANXIOUS, OR ON EDGE: NOT AT ALL
IF YOU CHECKED OFF ANY PROBLEMS ON THIS QUESTIONNAIRE, HOW DIFFICULT HAVE THESE PROBLEMS MADE IT FOR YOU TO DO YOUR WORK, TAKE CARE OF THINGS AT HOME, OR GET ALONG WITH OTHER PEOPLE: NOT DIFFICULT AT ALL
5. BEING SO RESTLESS THAT IT IS HARD TO SIT STILL: NOT AT ALL

## 2021-05-26 ASSESSMENT — PAIN SCALES - GENERAL: PAINLEVEL: NO PAIN (0)

## 2021-05-26 ASSESSMENT — MIFFLIN-ST. JEOR: SCORE: 1955.08

## 2021-05-26 ASSESSMENT — PATIENT HEALTH QUESTIONNAIRE - PHQ9
5. POOR APPETITE OR OVEREATING: NOT AT ALL
SUM OF ALL RESPONSES TO PHQ QUESTIONS 1-9: 0

## 2021-05-26 NOTE — NURSING NOTE
"Patient presents to the clinic for skin check of the back and neck.  Patient would like to discuss mole removal.      Chief Complaint   Patient presents with     Derm Problem       Initial /78 (BP Location: Right arm, Patient Position: Sitting, Cuff Size: Adult Regular)   Pulse 56   Temp 98.5  F (36.9  C) (Temporal)   Resp 16   Ht 1.975 m (6' 5.75\")   Wt 92.1 kg (203 lb)   SpO2 94%   BMI 23.61 kg/m   Estimated body mass index is 23.61 kg/m  as calculated from the following:    Height as of this encounter: 1.975 m (6' 5.75\").    Weight as of this encounter: 92.1 kg (203 lb).  Medication Reconciliation: complete    Alia Vasquez LPN    "

## 2021-05-26 NOTE — PROGRESS NOTES
"Nursing Notes:   Alia Vasquez LPN  5/26/2021  4:56 PM  Signed  Patient presents to the clinic for skin check of the back and neck.  Patient would like to discuss mole removal.      Chief Complaint   Patient presents with     Derm Problem       Initial /78 (BP Location: Right arm, Patient Position: Sitting, Cuff Size: Adult Regular)   Pulse 56   Temp 98.5  F (36.9  C) (Temporal)   Resp 16   Ht 1.975 m (6' 5.75\")   Wt 92.1 kg (203 lb)   SpO2 94%   BMI 23.61 kg/m   Estimated body mass index is 23.61 kg/m  as calculated from the following:    Height as of this encounter: 1.975 m (6' 5.75\").    Weight as of this encounter: 92.1 kg (203 lb).  Medication Reconciliation: complete    Alia Vasquez LPN         Assessment & Plan       ICD-10-CM    1. Sebaceous cyst  L72.3 GENERAL SURG ADULT REFERRAL   2. Multiple atypical nevi  D22.9 DERMATOLOGY ADULT REFERRAL       Given appearance of the cyst, it is unlikely to ever fully resolve and he should have it removed.  At this point it seems to indurated to have successful removal.  Recommend he wait a week and then consider excision.  Referral placed to general surgery    Ideally at that time the skin tag could be removed as well.    Additionally he has multiple compound and potentially atypical nevi.  I am inclined to biopsy one of them to see if he has any atypia.  Other option would be to see dermatology for evaluation to determine if they recommend biopsy.  He prefers to see dermatology.  Referral placed to see [facility].  He is aware it could be a wait, but prefers to avoid travel for this non-urgent skin check.      Venu Stringer MD     [facility] [place] [facility]      SUBJECTIVE:  41 year old male presents for a back cyst and skin lesions    Last week he had a cyst become infected, increase in size, and then spontaneously drained.  His wife prompted him to make a clinic appointment, but thankfully it drained and has improved having reduced in size. " " No fever.    He has a skin lesion, which is like a firm skin tag on his neckline that rubs against his shirt.  He would like this removed.    Has multiple compound nevi on the back.  Is wondering about seeing dermatology.    Works as a hendrix      REVIEW OF SYSTEMS:    Pertinent items are noted in HPI.    Current Outpatient Medications   Medication Sig Dispense Refill     ketoconazole (NIZORAL) 2 % external shampoo Apply 1 mL topically daily       No Known Allergies    OBJECTIVE:  /78 (BP Location: Right arm, Patient Position: Sitting, Cuff Size: Adult Regular)   Pulse 56   Temp 98.5  F (36.9  C) (Temporal)   Resp 16   Ht 1.975 m (6' 5.75\")   Wt 92.1 kg (203 lb)   SpO2 94%   BMI 23.61 kg/m      EXAM:  General Appearance: Alert. No acute distress  Psychiatric: Normal affect and mentation  Skin: 8 mm firm broad-based skin tag at left upper back  .  1 cm open sebaceous cyst on right upper back with surrounding induration  Multiple compound nevi on back.     "

## 2021-05-27 ASSESSMENT — ANXIETY QUESTIONNAIRES: GAD7 TOTAL SCORE: 0

## 2021-06-04 ENCOUNTER — MEDICAL CORRESPONDENCE (OUTPATIENT)
Dept: HEALTH INFORMATION MANAGEMENT | Facility: CLINIC | Age: 41
End: 2021-06-04

## 2021-10-26 ENCOUNTER — HOSPITAL ENCOUNTER (OUTPATIENT)
Dept: GENERAL RADIOLOGY | Facility: OTHER | Age: 41
End: 2021-10-26
Attending: FAMILY MEDICINE
Payer: COMMERCIAL

## 2021-10-26 ENCOUNTER — OFFICE VISIT (OUTPATIENT)
Dept: FAMILY MEDICINE | Facility: OTHER | Age: 41
End: 2021-10-26
Attending: FAMILY MEDICINE
Payer: COMMERCIAL

## 2021-10-26 VITALS
HEART RATE: 57 BPM | TEMPERATURE: 96.7 F | HEIGHT: 78 IN | BODY MASS INDEX: 23.46 KG/M2 | DIASTOLIC BLOOD PRESSURE: 72 MMHG | SYSTOLIC BLOOD PRESSURE: 138 MMHG | WEIGHT: 202.8 LBS | RESPIRATION RATE: 14 BRPM | OXYGEN SATURATION: 100 %

## 2021-10-26 DIAGNOSIS — M75.41 SUBACROMIAL IMPINGEMENT OF RIGHT SHOULDER: ICD-10-CM

## 2021-10-26 DIAGNOSIS — M75.51 SUBACROMIAL BURSITIS OF RIGHT SHOULDER JOINT: ICD-10-CM

## 2021-10-26 DIAGNOSIS — G25.89 SCAPULAR DYSKINESIS: Primary | ICD-10-CM

## 2021-10-26 PROCEDURE — 73030 X-RAY EXAM OF SHOULDER: CPT | Mod: RT

## 2021-10-26 PROCEDURE — G0463 HOSPITAL OUTPT CLINIC VISIT: HCPCS | Mod: 25

## 2021-10-26 PROCEDURE — G0463 HOSPITAL OUTPT CLINIC VISIT: HCPCS

## 2021-10-26 PROCEDURE — 99214 OFFICE O/P EST MOD 30 MIN: CPT | Performed by: FAMILY MEDICINE

## 2021-10-26 ASSESSMENT — PAIN SCALES - GENERAL: PAINLEVEL: NO PAIN (0)

## 2021-10-26 ASSESSMENT — MIFFLIN-ST. JEOR: SCORE: 1950.2

## 2021-10-26 NOTE — NURSING NOTE
"Chief Complaint   Patient presents with     Shoulder Pain     right shoulder pain, no known injury, ongoing 6 months, pain 3-8/10     Patient presents to clinic today for right shoulder pain ongoing and worsening for the past 6 months. No known injury. Pain ranges from 3-8/10, more pain with more activity and use. Pain 0/10 today.      Initial /72 (BP Location: Right arm, Patient Position: Sitting, Cuff Size: Adult Regular)   Pulse 57   Temp (!) 96.7  F (35.9  C) (Tympanic)   Resp 14   Ht 1.969 m (6' 5.5\")   Wt 92 kg (202 lb 12.8 oz)   SpO2 100%   BMI 23.74 kg/m   Estimated body mass index is 23.74 kg/m  as calculated from the following:    Height as of this encounter: 1.969 m (6' 5.5\").    Weight as of this encounter: 92 kg (202 lb 12.8 oz).     FOOD SECURITY SCREENING QUESTIONS  Hunger Vital Signs:  Within the past 12 months we worried whether our food would run out before we got money to buy more. Never  Within the past 12 months the food we bought just didn't last and we didn't have money to get more. Never      Medication Reconciliation: Complete      Shaina Walker LPN   "

## 2021-10-26 NOTE — PROGRESS NOTES
"HPI:  41-year-old male coming in for evaluation of right shoulder pain.  He reports that his pain has been present for approximately 6 months.  Recently his symptoms significantly improved.  Currently he is not experiencing all that much pain.  He feels like he may be should have canceled the appointment but wanted it looked at.  He rates his pain as 3-8/10 when symptomatic.  He has difficulty with sleeping, lifting, and doing activities overhead (especially with weight).  He works as a hendrix.  He has tried some occasional ibuprofen.  He feels like as his work has changed and he is not using his right shoulder/extremity as much his pain has improved.  No symptoms on the left side.      EXAM:  /72 (BP Location: Right arm, Patient Position: Sitting, Cuff Size: Adult Regular)   Pulse 57   Temp (!) 96.7  F (35.9  C) (Tympanic)   Resp 14   Ht 1.969 m (6' 5.5\")   Wt 92 kg (202 lb 12.8 oz)   SpO2 100%   BMI 23.74 kg/m    MUSCULOSKELETAL EXAM:  RIGHT SHOULDER  Inspection:  -No gross deformity  -No bruising or swelling  -Scars:  None    Tenderness to palpation of the:  -SC joint:  Negative  -AC joint:  Negative  -Clavicle:  Negative  -Biceps tendon in bicipital groove:  Negative  -Deltoid musculature:  Negative    Range of Motion:  -Active flexion:  180 left, 180 right  -Active abduction:  180 left, 180 right  -Passive external rotation in 90 of abduction:  100  -Passive internal rotation in 90 of abduction:  40    Strength:  -Supraspinatus:  5/5  -Infraspinatus:  5/5  -Subscapularis:  5/5  -Deltoid:  5/5    Special Tests:  -Hemanth test:  Negative  -Hidalgo test:  Negative  -Neer test:  Negative  -Mid-arc pain:  Absent  -Speeds test:  Negative  -O Sanchez active compression test:  Negative  -Crossarm adduction test:  Negative  -Apprehension test:  Negative  -Relocation test:  Negative  -Yergason test:  Negative  -Scapular dyskinesis:  Present  -Scapular assist test:  Negative    Other:  -Intact sensation to " light touch distally.  -No signs of cyanosis. Normal skin temperature of the upper extremity.  -Elbow:  No gross deformity. Full range of motion.  -Hand/wrist:  No gross deformity. Full range of motion.  -Left shoulder:  No gross deformity. No palpable tenderness. Normal strength.      IMAGING:  10/26/2021: 3 view right shoulder x-ray  -No fracture, dislocation, or bony lesion.      ASSESSMENT/PLAN:  Diagnoses and all orders for this visit:  Scapular dyskinesis  -     XR Shoulder Right G/E 3 Views  Subacromial impingement of right shoulder  Subacromial bursitis of right shoulder joint    41-year-old male coming in for evaluation of right shoulder pain.  Patient's history is suggestive of subacromial impingement/bursitis.  No evidence on exam to suggest instability or rotator cuff pathology.  He does have scapular dyskinesis seen on exam.  As his symptoms have significantly improved and he is not endorsing any pain at this time I do not feel there is a role for advanced imaging, injection therapy, or surgical intervention.  We will treat his symptoms conservatively with subsequent flares.  X-rays from 10/26 were personally reviewed in the office with the findings as demonstrated above by my interpretation.  -Activities as tolerated  -Patient may benefit from avoiding heavy overhead lifting  -Handout given with home exercises for subacromial bursitis  -Aleve 440 mg twice daily x7 days for flares  -With persistent symptoms that do not respond to above interventions, consider follow-up for reevaluation and consideration of formal PT referral versus CSI  -Follow-up as needed      Raul Moore MD  10/26/2021  9:04 AM    Total time spent with this patient was 34 minutes which included chart review, visualization and interpretation of images, time spent with the patient, and documentation.

## 2021-11-03 DIAGNOSIS — B35.4 TINEA CORPORIS: Primary | ICD-10-CM

## 2021-11-04 RX ORDER — KETOCONAZOLE 20 MG/ML
SHAMPOO TOPICAL
Qty: 120 ML | Refills: 0 | Status: SHIPPED | OUTPATIENT
Start: 2021-11-04

## 2021-11-04 NOTE — TELEPHONE ENCOUNTER
"Requested Prescriptions   Pending Prescriptions Disp Refills     ketoconazole (NIZORAL) 2 % external shampoo [Pharmacy Med Name: KETOCONAZOLE 2% SHAMPOO] 120 mL 0     Sig: APPLY FOR 5 MINUTES AND WASH OFF. USE FOR 3 DAYS.       Antifungal Agents Passed - 11/3/2021  1:00 AM        Passed - Recent (12 mo) or future (30 days) visit within the authorizing provider's specialty     Patient has had an office visit with the authorizing provider or a provider within the authorizing providers department within the previous 12 mos or has a future within next 30 days. See \"Patient Info\" tab in inbasket, or \"Choose Columns\" in Meds & Orders section of the refill encounter.              Passed - Not Fluconazole or Terconazole      If oral Fluconazole or Terconazole, may refill if indicated in progress notes.           Passed - Medication is active on med list     Last Written Prescription Date:  11/15/19  Last Office Visit: 10/26/21 [facility]  Future Office visit: none      Routing refill request to provider for review/approval because:  Medication is reported/historical  Brenda J. Goodell, RN on 11/4/2021 at 10:54 AM      "

## 2021-11-29 ENCOUNTER — OFFICE VISIT (OUTPATIENT)
Dept: FAMILY MEDICINE | Facility: OTHER | Age: 41
End: 2021-11-29
Attending: FAMILY MEDICINE
Payer: COMMERCIAL

## 2021-11-29 VITALS
DIASTOLIC BLOOD PRESSURE: 80 MMHG | WEIGHT: 207.2 LBS | TEMPERATURE: 97.7 F | OXYGEN SATURATION: 97 % | HEART RATE: 52 BPM | SYSTOLIC BLOOD PRESSURE: 150 MMHG | BODY MASS INDEX: 24.25 KG/M2 | RESPIRATION RATE: 16 BRPM

## 2021-11-29 DIAGNOSIS — H65.92 OME (OTITIS MEDIA WITH EFFUSION), LEFT: Primary | ICD-10-CM

## 2021-11-29 DIAGNOSIS — R03.0 ELEVATED BLOOD PRESSURE READING WITHOUT DIAGNOSIS OF HYPERTENSION: ICD-10-CM

## 2021-11-29 PROCEDURE — G0463 HOSPITAL OUTPT CLINIC VISIT: HCPCS

## 2021-11-29 PROCEDURE — 99213 OFFICE O/P EST LOW 20 MIN: CPT | Performed by: FAMILY MEDICINE

## 2021-11-29 RX ORDER — AMOXICILLIN 875 MG
875 TABLET ORAL 2 TIMES DAILY
Qty: 20 TABLET | Refills: 0 | Status: SHIPPED | OUTPATIENT
Start: 2021-11-29 | End: 2021-12-09

## 2021-11-29 ASSESSMENT — ENCOUNTER SYMPTOMS
COUGH: 0
NERVOUS/ANXIOUS: 0
FEVER: 0
SHORTNESS OF BREATH: 0
CHILLS: 0

## 2021-11-29 ASSESSMENT — PAIN SCALES - GENERAL: PAINLEVEL: NO PAIN (0)

## 2021-11-29 NOTE — PROGRESS NOTES
"  SUBJECTIVE:   Nursing Notes:   Gwendolyn Frye LPN  11/29/2021  4:12 PM  Sign at exiting of workspace  Chief Complaint   Patient presents with     Ear Problem     ear pain, hearing issues in left ear   patient states he has had an ear ache for 2 weeks that lasted a couple days but now the ache is back. Hearing is worse in the left ear and patient blew nose yesterday and got dizzy and lightheaded from blowing his nose.      Initial BP (!) 150/97 (BP Location: Right arm, Patient Position: Sitting, Cuff Size: Adult Regular)   Pulse 52   Temp 97.7  F (36.5  C) (Tympanic)   Resp 16   Wt 94 kg (207 lb 3.2 oz)   SpO2 97%   BMI 24.25 kg/m   Estimated body mass index is 24.25 kg/m  as calculated from the following:    Height as of 10/26/21: 1.969 m (6' 5.5\").    Weight as of this encounter: 94 kg (207 lb 3.2 oz).  Medication Reconciliation: complete    FOOD SECURITY SCREENING QUESTIONS  Hunger Vital Signs:  Within the past 12 months we worried whether our food would run out before we got money to buy more. Never  Within the past 12 months the food we bought just didn't last and we didn't have money to get more. Never  Melva Higgins LPN 11/29/2021 4:10 PM       Advance care directive on file? no  Advance care directive provided to patient? no       Melva Higgins, SIMEON      Yrn Falk is a 41 year old male who presents to clinic today for left ear pain.  He has had pain in this ear for a couple of weeks.  His hearing has been worse in this ear.  After blowing his nose yesterday, he got dizzy and lightheaded.  Initially just had pain in his left ear.  Most of his hearing is back now, but not completely normal.  No fever.  Has had a little rhinorrhea, but nothing significant.  He has no prior history of hypertension.  Unknown if there is a family history.  He had been taking some NSAIDs for a sore back, but no decongestants recently.    HPI    I personally reviewed " medications/allergies/history listed below:    Patient Active Problem List    Diagnosis Date Noted     Tinea corporis 01/16/2015     Priority: Medium     Tinea versicolor 01/16/2015     Priority: Medium     Tobacco use 01/16/2015     Priority: Medium     Past Medical History:   Diagnosis Date     Tobacco use 1/16/2015      Past Surgical History:   Procedure Laterality Date     VASECTOMY      5/31/13     History reviewed. No pertinent family history.  Social History     Tobacco Use     Smoking status: Former Smoker     Packs/day: 1.50     Types: Cigarettes     Smokeless tobacco: Never Used   Substance Use Topics     Alcohol use: No     Social History     Social History Narrative    Works as a hendrix. , 2 children.    Preload  5/29/2013     Current Outpatient Medications   Medication Sig Dispense Refill     amoxicillin (AMOXIL) 875 MG tablet Take 1 tablet (875 mg) by mouth 2 times daily for 10 days 20 tablet 0     ketoconazole (NIZORAL) 2 % external shampoo APPLY FOR 5 MINUTES AND WASH OFF. USE FOR 3 DAYS. 120 mL 0     No Known Allergies    Review of Systems   Constitutional: Negative for chills and fever.   HENT: Positive for ear pain and hearing loss.    Respiratory: Negative for cough and shortness of breath.    Cardiovascular: Negative for peripheral edema.   Psychiatric/Behavioral: Negative for mood changes. The patient is not nervous/anxious.         OBJECTIVE:     BP (!) 150/80   Pulse 52   Temp 97.7  F (36.5  C) (Tympanic)   Resp 16   Wt 94 kg (207 lb 3.2 oz)   SpO2 97%   BMI 24.25 kg/m    Body mass index is 24.25 kg/m .  Physical Exam  Constitutional:       Appearance: Normal appearance. He is normal weight.   HENT:      Head: Normocephalic.      Right Ear: Tympanic membrane normal.      Ears:      Comments: Left TM is retracted and injected superiorly.     Nose: Nose normal.      Mouth/Throat:      Mouth: Mucous membranes are moist.      Pharynx: No oropharyngeal exudate or posterior  oropharyngeal erythema.   Eyes:      Extraocular Movements: Extraocular movements intact.      Pupils: Pupils are equal, round, and reactive to light.   Cardiovascular:      Rate and Rhythm: Normal rate and regular rhythm.      Pulses: Normal pulses.      Heart sounds: Normal heart sounds. No murmur heard.      Pulmonary:      Effort: Pulmonary effort is normal.      Breath sounds: Normal breath sounds. No wheezing, rhonchi or rales.   Musculoskeletal:      Cervical back: Normal range of motion and neck supple.      Right lower leg: No edema.      Left lower leg: No edema.   Lymphadenopathy:      Cervical: No cervical adenopathy.   Neurological:      Mental Status: He is alert.   Psychiatric:         Mood and Affect: Mood normal.         Behavior: Behavior normal.           PHQ-9 SCORE 5/26/2021   PHQ-9 Total Score 0       PHQ-2 Score:     PHQ-2 ( 1999 [employer]) 11/29/2021 10/26/2021   Q1: Little interest or pleasure in doing things 0 0   Q2: Feeling down, depressed or hopeless 0 0   PHQ-2 Score 0 0   PHQ-2 Total Score (12-17 Years)- Positive if 3 or more points; Administer PHQ-A if positive - 0       ELTON-7 SCORE 5/26/2021   Total Score 0           I personally reviewed results withpatient as listed below:   Diagnostic Test Results:  none     ASSESSMENT/PLAN:       ICD-10-CM    1. OME (otitis media with effusion), left  H65.92 amoxicillin (AMOXIL) 875 MG tablet   2. Elevated blood pressure reading without diagnosis of hypertension  R03.0        1.  He does feel that his symptoms have improved, but are not completely resolved.  Prescription for amoxicillin given (printed) to fill if symptoms are not getting better over the next several days.  If still not resolving, he may certainly follow up.  2.  Recommend checking blood pressure at home at least 2-3 times per week over the next couple of weeks.  Discussed follow up if consistently above 140/90.    Sabi Zambrano MD  [facility] AND [facility]

## 2021-11-29 NOTE — PATIENT INSTRUCTIONS
Check your blood pressure over the next 2 or 3 weeks (2-3 times per week).  If your blood pressure is consistently over 140/90, please let us know.

## 2021-11-29 NOTE — NURSING NOTE
"Chief Complaint   Patient presents with     Ear Problem     ear pain, hearing issues in left ear   patient states he has had an ear ache for 2 weeks that lasted a couple days but now the ache is back. Hearing is worse in the left ear and patient blew nose yesterday and got dizzy and lightheaded from blowing his nose.      Initial BP (!) 150/97 (BP Location: Right arm, Patient Position: Sitting, Cuff Size: Adult Regular)   Pulse 52   Temp 97.7  F (36.5  C) (Tympanic)   Resp 16   Wt 94 kg (207 lb 3.2 oz)   SpO2 97%   BMI 24.25 kg/m   Estimated body mass index is 24.25 kg/m  as calculated from the following:    Height as of 10/26/21: 1.969 m (6' 5.5\").    Weight as of this encounter: 94 kg (207 lb 3.2 oz).  Medication Reconciliation: complete    FOOD SECURITY SCREENING QUESTIONS  Hunger Vital Signs:  Within the past 12 months we worried whether our food would run out before we got money to buy more. Never  Within the past 12 months the food we bought just didn't last and we didn't have money to get more. Never  Melva Higgins LPN 11/29/2021 4:10 PM       Advance care directive on file? no  Advance care directive provided to patient? no       Melva Higgins LPN  "

## 2022-03-23 ENCOUNTER — HOSPITAL ENCOUNTER (OUTPATIENT)
Dept: GENERAL RADIOLOGY | Facility: OTHER | Age: 42
Discharge: HOME | End: 2022-03-23
Attending: FAMILY MEDICINE
Payer: COMMERCIAL

## 2022-03-23 ENCOUNTER — OFFICE VISIT (OUTPATIENT)
Dept: FAMILY MEDICINE | Facility: OTHER | Age: 42
End: 2022-03-23
Attending: FAMILY MEDICINE
Payer: COMMERCIAL

## 2022-03-23 VITALS
RESPIRATION RATE: 16 BRPM | SYSTOLIC BLOOD PRESSURE: 140 MMHG | OXYGEN SATURATION: 97 % | TEMPERATURE: 97.5 F | DIASTOLIC BLOOD PRESSURE: 70 MMHG | BODY MASS INDEX: 23.41 KG/M2 | WEIGHT: 200 LBS | HEART RATE: 78 BPM

## 2022-03-23 DIAGNOSIS — S29.9XXA INJURY OF STERNUM, INITIAL ENCOUNTER: ICD-10-CM

## 2022-03-23 DIAGNOSIS — S29.9XXA INJURY OF STERNUM, INITIAL ENCOUNTER: Primary | ICD-10-CM

## 2022-03-23 PROCEDURE — 71120 X-RAY EXAM BREASTBONE 2/>VWS: CPT

## 2022-03-23 PROCEDURE — G0463 HOSPITAL OUTPT CLINIC VISIT: HCPCS | Mod: 25

## 2022-03-23 PROCEDURE — 99213 OFFICE O/P EST LOW 20 MIN: CPT | Performed by: FAMILY MEDICINE

## 2022-03-23 ASSESSMENT — PAIN SCALES - GENERAL: PAINLEVEL: MILD PAIN (2)

## 2022-03-23 NOTE — NURSING NOTE
"Patient presents to the clinic for sternum pain.    FOOD SECURITY SCREENING QUESTIONS:    The next two questions are to help us understand your food security.  If you are feeling you need any assistance in this area, we have resources available to support you today.    Hunger Vital Signs:  Within the past 12 months we worried whether our food would run out before we got money to buy more. Never  Within the past 12 months the food we bought just didn't last and we didn't have money to get more. Never    Advance Care Directive on file? no  Advance Care Directive provided to patient? Declined.  Chief Complaint   Patient presents with     Musculoskeletal Problem       Initial BP (!) 140/70   Pulse 78   Temp 97.5  F (36.4  C) (Temporal)   Resp 16   Wt 90.7 kg (200 lb)   SpO2 97%   BMI 23.41 kg/m   Estimated body mass index is 23.41 kg/m  as calculated from the following:    Height as of 10/26/21: 1.969 m (6' 5.5\").    Weight as of this encounter: 90.7 kg (200 lb).  Medication Reconciliation: complete        Alia Vasquez LPN       "

## 2022-03-23 NOTE — PROGRESS NOTES
Assessment & Plan       ICD-10-CM    1. Injury of sternum, initial encounter  S29.9XXA XR Sternum 2 Views     Discussed findings.  He could have contusion of the sternum.  Fracture is possible, but usually would have seen bruising and not has easily improved.  Improvement is reassuring.  However, pain flared up with use of arms.  Elected to obtain an x-ray to evaluate for fracture.  No fracture evident  Resume activity as tolerated, gradually building up.  May ice and use ibuprofen as needed.    Return if symptoms worsen or fail to improve.    Venu Stringer MD  [facility] [place]   Markie is a 41 year old who presents for the following health issues     Musculoskeletal Problem  This is a new problem. The current episode started 1 to 4 weeks ago. The problem occurs daily. The problem has been waxing and waning. The symptoms are aggravated by stress. He has tried acetaminophen, lying down and rest for the symptoms. The treatment provided mild relief.   History of Present Illness       Reason for visit:  Sternum pain    He eats 4 or more servings of fruits and vegetables daily.He consumes 2 sweetened beverage(s) daily.He exercises with enough effort to increase his heart rate 60 or more minutes per day.  He exercises with enough effort to increase his heart rate 7 days per week.        Hit [employer] 10 days ago  Sternal pain afterwards.  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  Does not clearly have shortness of breath with exertion      Review of Systems   As above        Objective    BP (!) 140/70   Pulse 78   Temp 97.5  F (36.4  C) (Temporal)   Resp 16   Wt 90.7 kg (200 lb)   SpO2 97%   BMI 23.41 kg/m    Body mass index is 23.41 kg/m .  Physical Exam   General Appearance: Alert. No acute  distress  Psychiatric: Normal affect and mentation  Musculoskeletal: Tender over upper sternum near sternal manubrial joint.  Normal clavicles.  Normal manubrium.  Normal xiphoid.  Pain not worsened with compression of ribs.  No thoracic back tenderness.  No aggravation of pain with engagement of pectoralis muscles.    Results for orders placed or performed during the hospital encounter of 03/23/22   XR Sternum 2 Views     Status: None    Narrative    PROCEDURE: XR STERNUM 2 VW 3/23/2022 2:55 PM    HISTORY: Injury of sternum, initial encounter    COMPARISONS: None.    TECHNIQUE: 2 views.    FINDINGS: No acute fracture is seen. There is no focal bone lesion.         Impression    IMPRESSION: No acute fracture.    JULES RODRIGUEZ MD         SYSTEM ID:  [id number]

## 2023-07-28 ENCOUNTER — OFFICE VISIT (OUTPATIENT)
Dept: FAMILY MEDICINE | Facility: OTHER | Age: 43
End: 2023-07-28
Attending: NURSE PRACTITIONER
Payer: COMMERCIAL

## 2023-07-28 VITALS
TEMPERATURE: 98.6 F | SYSTOLIC BLOOD PRESSURE: 112 MMHG | DIASTOLIC BLOOD PRESSURE: 70 MMHG | OXYGEN SATURATION: 97 % | HEART RATE: 62 BPM | WEIGHT: 195.8 LBS | RESPIRATION RATE: 16 BRPM | BODY MASS INDEX: 23.84 KG/M2 | HEIGHT: 76 IN

## 2023-07-28 DIAGNOSIS — M79.89 SWELLING OF LEFT HAND: ICD-10-CM

## 2023-07-28 DIAGNOSIS — T63.461A WASP STING, ACCIDENTAL OR UNINTENTIONAL, INITIAL ENCOUNTER: Primary | ICD-10-CM

## 2023-07-28 PROCEDURE — 99213 OFFICE O/P EST LOW 20 MIN: CPT | Performed by: NURSE PRACTITIONER

## 2023-07-28 PROCEDURE — G0463 HOSPITAL OUTPT CLINIC VISIT: HCPCS

## 2023-07-28 ASSESSMENT — PAIN SCALES - GENERAL: PAINLEVEL: NO PAIN (0)

## 2023-07-28 NOTE — PROGRESS NOTES
ASSESSMENT/PLAN:     I have reviewed the nursing notes.  I have reviewed the findings, diagnosis, plan and need for follow up with the patient.        1. Wasp sting, accidental or unintentional, initial encounter  2. Swelling of left hand    Swelling improving after one dose of Claritin.    Encouraged use of Claritin BID or Claritin in the morning and Benadryl at bedtime PRN until swelling resolved.    May use over-the-counter Tylenol or ibuprofen TID or naproxen BID PRN  Discussed option of treating with Prednisone but swelling is improving so opted to use OTC medications.    Encouraged use of cool compresses and elevation  No symptoms of anaphylaxis so Epi pen not indicated.  Reviewed with patient symptoms of anaphylaxis for future reactions.    No clinical indications of cellulitis at this time - prompt follow up if concerns.    Discussed warning signs/symptoms indicative of need to f/u  Follow up if symptoms persist or worsen or concerns      I explained my diagnostic considerations and recommendations to the patient, who voiced understanding and agreement with the treatment plan. All questions were answered. We discussed potential side effects of any prescribed or recommended therapies, as well as expectations for response to treatments.    Malia Dos Santos NP  [facility]      SUBJECTIVE:   Yrn Falk is a 43 year old male who presents to clinic today for the following health issues:  Wasp sting    HPI  States he was stung yesterday by a wasp with worsening swelling and redness today.  He took Claritin this morning with some decrease in swelling.  States the area has been itching with tightness but not painful.  No fevers.  Denies any symptoms of anaphylaxis such as scratchy throat, throat swelling, difficulty swallowing, chest pain, chest tightness, shortness of breath, cough, dizziness, light headedness, etc.  No hx of anaphylaxis.  Improvement with Claritin.  He has not taken  "any Benadryl or NSAIDs.          Past Medical History:   Diagnosis Date    Tobacco use 1/16/2015     Past Surgical History:   Procedure Laterality Date    VASECTOMY      5/31/13     Social History     Tobacco Use    Smoking status: Former     Packs/day: 1.50     Types: Cigarettes    Smokeless tobacco: Never   Substance Use Topics    Alcohol use: No     Current Outpatient Medications   Medication Sig Dispense Refill    ketoconazole (NIZORAL) 2 % external shampoo APPLY FOR 5 MINUTES AND WASH OFF. USE FOR 3 DAYS. 120 mL 0     No Known Allergies      Past medical history, past surgical history, current medications and allergies reviewed and accurate to the best of my knowledge.        OBJECTIVE:     /70 (BP Location: Right arm, Patient Position: Sitting, Cuff Size: Adult Regular)   Pulse 62   Temp 98.6  F (37  C) (Tympanic)   Resp 16   Ht 1.93 m (6' 4\")   Wt 88.8 kg (195 lb 12.8 oz)   SpO2 97%   BMI 23.83 kg/m    Body mass index is 23.83 kg/m .      Physical Exam  General Appearance: Well appearing adult male, appropriate appearance for age. No acute distress  Respiratory: normal chest wall and respirations.  Normal effort.  No cough appreciated.  Cardiac:  CMS intact to left upper extremity with brisk capillary refill and strong radial pulse  Musculoskeletal:  left dorsal hand with mild generalized swelling, non tender.  Normal movement of left fingers.  Equal movement of bilateral upper extremities.  Equal movement of bilateral lower extremities.  Normal gait.    Dermatological: left dorsal hand with diffuse erythema and warmth  Psychological: normal affect, alert, oriented, and pleasant.         "

## 2023-07-28 NOTE — NURSING NOTE
Pt here for wasp sting to left hand yesterday.  Was very swollen today, could hardly see his knuckles.  Wife gave him Claritin at 4:30 and it is helping.  Mayda Archibald CMA (MA)......................7/28/2023  6:26 PM       Medication Reconciliation: complete    Mayda Archibald CMA  7/28/2023 6:26 PM      FOOD SECURITY SCREENING QUESTIONS:    The next two questions are to help us understand your food security.  If you are feeling you need any assistance in this area, we have resources available to support you today.    Hunger Vital Signs:  Within the past 12 months we worried whether our food would run out before we got money to buy more. Never  Within the past 12 months the food we bought just didn't last and we didn't have money to get more. Never  Mayda Archibald CMA,LPN on 7/28/2023 at 6:27 PM

## 2023-09-15 ENCOUNTER — OFFICE VISIT (OUTPATIENT)
Dept: FAMILY MEDICINE | Facility: OTHER | Age: 43
End: 2023-09-15
Attending: NURSE PRACTITIONER
Payer: COMMERCIAL

## 2023-09-15 VITALS
SYSTOLIC BLOOD PRESSURE: 130 MMHG | TEMPERATURE: 97.1 F | WEIGHT: 198 LBS | HEIGHT: 78 IN | HEART RATE: 55 BPM | BODY MASS INDEX: 22.91 KG/M2 | RESPIRATION RATE: 16 BRPM | DIASTOLIC BLOOD PRESSURE: 74 MMHG | OXYGEN SATURATION: 100 %

## 2023-09-15 DIAGNOSIS — T63.441A BEE STING, ACCIDENTAL OR UNINTENTIONAL, INITIAL ENCOUNTER: Primary | ICD-10-CM

## 2023-09-15 DIAGNOSIS — M79.89 SWELLING OF LEFT HAND: ICD-10-CM

## 2023-09-15 PROCEDURE — G0463 HOSPITAL OUTPT CLINIC VISIT: HCPCS

## 2023-09-15 PROCEDURE — 99213 OFFICE O/P EST LOW 20 MIN: CPT

## 2023-09-15 RX ORDER — PREDNISONE 20 MG/1
40 TABLET ORAL DAILY
Qty: 10 TABLET | Refills: 0 | Status: SHIPPED | OUTPATIENT
Start: 2023-09-15 | End: 2023-09-20

## 2023-09-15 ASSESSMENT — PAIN SCALES - GENERAL: PAINLEVEL: MILD PAIN (2)

## 2023-09-15 NOTE — PROGRESS NOTES
ASSESSMENT/PLAN:    I have reviewed the nursing notes.  I have reviewed the findings, diagnosis, plan and need for follow up with the patient.    1. Bee sting, accidental or unintentional, initial encounter  2. Swelling of left hand  - predniSONE (DELTASONE) 20 MG tablet; Take 2 tablets (40 mg) by mouth daily for 5 days  Dispense: 10 tablet; Refill: 0    Patient presents with a bee sting to his left middle finger.  This caused swelling of his left middle finger that has extended to his entire left hand.  Will treat with a short burst of prednisone.  Advised patient to take this medication in the morning with food and not to take any NSAIDs while on this medication.  Advised that he can take Claritin or Zyrtec to help with the swelling and itching.  May also use cool compresses.  May use Tylenol as needed for pain.  No concerns for airway compromise based on physical exam and patient information.    Discussed warning signs/symptoms indicative of need to f/u    Follow up if symptoms persist or worsen or concerns    I explained my diagnostic considerations and recommendations to the patient, who voiced understanding and agreement with the treatment plan. All questions were answered. We discussed potential side effects of any prescribed or recommended therapies, as well as expectations for response to treatments.    IAN Chirinos CNP  9/15/2023  9:31 AM    HPI:    Yrn Falk is a 43 year old male  who presents to [facility] today for concerns of bee sting    Patient states that he was taking the garbage out at work yesterday around 10 AM and felt something sting his left middle finger.  He did not get a good look at what stung him but he will believes it was a bee.  Patient states he had a bee sting less than a month ago and did not react quite this significantly.  He states his left middle finger started to swell immediately after the sting and the rest of his hand did not start swelling until around  "3 AM this morning.  He states he took 2 Benadryl and Claritin which only minimally helped with the swelling.  He denies any shortness of breath, dizziness, or swelling beyond his left hand.  He states that the sting site was itchy yesterday.  He denies any drainage from the site.  He states he had some mild numbness of his left middle finger but that has since resolved.    Past Medical History:   Diagnosis Date    Tobacco use 1/16/2015     Past Surgical History:   Procedure Laterality Date    VASECTOMY      5/31/13     Social History     Tobacco Use    Smoking status: Former     Packs/day: 1.50     Types: Cigarettes    Smokeless tobacco: Never   Substance Use Topics    Alcohol use: No     Current Outpatient Medications   Medication Sig Dispense Refill    ketoconazole (NIZORAL) 2 % external shampoo APPLY FOR 5 MINUTES AND WASH OFF. USE FOR 3 DAYS. 120 mL 0     No Known Allergies  Past medical history, past surgical history, current medications and allergies reviewed and accurate to the best of my knowledge.      ROS:  Refer to HPI    /74 (BP Location: Right arm, Patient Position: Sitting, Cuff Size: Adult Regular)   Pulse 55   Temp 97.1  F (36.2  C) (Tympanic)   Resp 16   Ht 1.981 m (6' 6\")   Wt 89.8 kg (198 lb)   SpO2 100%   BMI 22.88 kg/m      EXAM:  General Appearance: Well appearing 43 year old male, appropriate appearance for age. No acute distress   Eyes: conjunctivae normal without erythema or irritation, corneas clear, no drainage or crusting, no eyelid swelling, pupils equal   Oropharynx: moist mucous membranes, posterior pharynx without erythema, tonsils symmetric and 1+, no erythema, no exudates or petechiae, no post nasal drip seen, no trismus, voice clear.    Nose:  Bilateral nares: no erythema, no edema, no drainage or congestion   Neck: supple without adenopathy  Respiratory: normal chest wall and respirations.  Normal effort. No increased work of breathing.  No cough " appreciated.  Cardiac: RRR with no murmurs  Musculoskeletal:  Equal movement of bilateral upper extremities.  Equal movement of bilateral lower extremities.  Normal gait.    Dermatological: bee sting of left middle finger with edema of finger and rest of left hand, mild tenderness with palpation of sting site, no erythema or ecchymosis  Neuro: Alert and oriented to person, place, and time.    Psychological: normal affect, alert, oriented, and pleasant.

## 2023-09-15 NOTE — NURSING NOTE
"Chief Complaint   Patient presents with    Swelling     Since yesterday - bee sting at 10a     Pat tx with benadryl and claritin.  Previous sting with swelling last month which was his first reaction  Benadryl not taking swelling down this time.    Initial /74 (BP Location: Right arm, Patient Position: Sitting, Cuff Size: Adult Regular)   Pulse 55   Temp 97.1  F (36.2  C) (Tympanic)   Resp 16   Ht 1.981 m (6' 6\")   Wt 89.8 kg (198 lb)   SpO2 100%   BMI 22.88 kg/m   Estimated body mass index is 22.88 kg/m  as calculated from the following:    Height as of this encounter: 1.981 m (6' 6\").    Weight as of this encounter: 89.8 kg (198 lb).     Advance Care Directive on file? no    FOOD SECURITY SCREENING QUESTIONS:    The next two questions are to help us understand your food security.  If you are feeling you need any assistance in this area, we have resources available to support you today.    Hunger Vital Signs:  Within the past 12 months we worried whether our food would run out before we got money to buy more. Never  Within the past 12 months the food we bought just didn't last and we didn't have money to get more. Never  Va Arellano LPN,SIMEON on 9/15/2023 at 9:26 AM      Va Arellano LPN     "